# Patient Record
Sex: FEMALE | Race: WHITE | NOT HISPANIC OR LATINO | URBAN - METROPOLITAN AREA
[De-identification: names, ages, dates, MRNs, and addresses within clinical notes are randomized per-mention and may not be internally consistent; named-entity substitution may affect disease eponyms.]

---

## 2022-04-07 ENCOUNTER — TELEMEDICINE (OUTPATIENT)
Dept: FAMILY MEDICINE CLINIC | Facility: TELEHEALTH | Age: 27
End: 2022-04-07

## 2022-04-07 ENCOUNTER — E-VISIT (OUTPATIENT)
Dept: ADMINISTRATIVE | Facility: OTHER | Age: 27
End: 2022-04-07

## 2022-04-07 DIAGNOSIS — N39.0 URINARY TRACT INFECTION WITHOUT HEMATURIA, SITE UNSPECIFIED: Primary | ICD-10-CM

## 2022-04-07 PROCEDURE — 99203 OFFICE O/P NEW LOW 30 MIN: CPT | Performed by: NURSE PRACTITIONER

## 2022-04-07 RX ORDER — AMOXICILLIN 875 MG/1
875 TABLET, COATED ORAL 2 TIMES DAILY
Qty: 20 TABLET | Refills: 0 | Status: SHIPPED | OUTPATIENT
Start: 2022-04-07 | End: 2022-04-17

## 2022-04-07 NOTE — PATIENT INSTRUCTIONS
Contact OB/GYN for possible culture results from initial UTI to discuss antibiotic change if needed. Follow up as needed. Increase fluids, anything but caffeine since it is a bladder irritant. Follow up with Urgent Care if symptoms worsen or the treatment provided does not resolve the illness. Go to the nearest Emergency Department for any signs of worsening kidney infection, such as severe back or abdominal pain with or without Nausea/Vomiting, fever, chills, hematuria or difficulty urinating.

## 2022-04-07 NOTE — PROGRESS NOTES
"Mode of Visit: Video  Location of patient: home  You have chosen to receive care through a telehealth visit.  The patient has signed the video visit consent form.  The visit included audio and video interaction. No technical issues occurred during this visit.   Video Visit Reason:   Free Text Description: Painful urination.  Chief Complaint  Urinary Tract Infection    Subjective          Karin Howell presents to [facility]    Urinary frequency, burning with urination since yesterday. She reports having a UTI on 3/27/2022 when she went into the ED for back pain. She was felt to have a \"kidney infection\". She was given something in her IV for pain and sent home with Cefdinir which she took until 4/2/2022. Symptoms seems to be resolved until yesterday. She reports having a vaginal delivery on 3/22/2022 and did have an indwelling catheter for several hours due to the >24 hr labor. Currently, she is having no back pain, fever, chills, nausea or hematuria. She is breastfeeding.    Urinary Tract Infection   This is a new problem. The current episode started yesterday. The quality of the pain is described as burning. There has been no fever. Associated symptoms include frequency and urgency. Pertinent negatives include no chills, discharge, flank pain, hematuria or nausea.     Review of Systems   Constitutional: Negative for chills and fever.   Gastrointestinal: Negative for nausea.   Genitourinary: Positive for dysuria, frequency, urgency and vaginal bleeding (recent vaginal delivery on 3/22/2022). Negative for decreased urine volume, difficulty urinating, flank pain, hematuria and vaginal discharge.     Objective   Vital Signs:   There were no vitals taken for this visit.    Physical Exam   Constitutional: She appears well-nourished. She does not have a sickly appearance. She does not appear ill. No distress.   HENT:   Mouth/Throat: Mouth/Lips are normal.  Abdominal: She exhibits " no distension and no flank tenderness. There is no CVA tenderness.     Result Review :                 Assessment and Plan    Diagnoses and all orders for this visit:    1. Urinary tract infection without hematuria, site unspecified (Primary)  -     amoxicillin (AMOXIL) 875 MG tablet; Take 1 tablet by mouth 2 (Two) Times a Day for 10 days.  Dispense: 20 tablet; Refill: 0        Increase fluids, anything but caffeine since it is a bladder irritant. Follow up with Urgent Care if symptoms worsen or the treatment provided does not resolve the illness. Go to the nearest Emergency Department for any signs of worsening kidney infection, such as severe back or abdominal pain with or without Nausea/Vomiting, fever, chills, hematuria or difficulty urinating.       I spent 20 minutes caring for Karin on this date of service. This time includes time spent by me in the following activities:preparing for the visit, obtaining and/or reviewing a separately obtained history, performing a medically appropriate examination and/or evaluation , counseling and educating the patient/family/caregiver, ordering medications, tests, or procedures, and documenting information in the medical record  Follow Up   Return if symptoms worsen or fail to improve.  Patient was given instructions and counseling regarding her condition or for health maintenance advice. Please see specific information pulled into the AVS if appropriate.

## 2023-04-17 ENCOUNTER — NURSE TRIAGE (OUTPATIENT)
Dept: CALL CENTER | Facility: HOSPITAL | Age: 28
End: 2023-04-17
Payer: COMMERCIAL

## 2023-04-17 NOTE — TELEPHONE ENCOUNTER
"[facility] transferred. Chest pain      Wanting to establish care with medical group in [place].  No specific provider requested.    Has been having some chest pressure for about a week comes and goes.  She feels it is anxiety related.  Care advice given and when to seek EMS. Advised will try transfer back to [facility] for assist with Medical group and appt. Advised to stay on hold.  She is at work on her break and unable to stay on hold. She will try again later    Reason for Disposition  • [1] Chest pain(s) lasting a few seconds AND [2] persists > 3 days    Additional Information  • Negative: SEVERE difficulty breathing (e.g., struggling for each breath, speaks in single words)  • Negative: Difficult to awaken or acting confused (e.g., disoriented, slurred speech)  • Negative: Shock suspected (e.g., cold/pale/clammy skin, too weak to stand, low BP, rapid pulse)  • Negative: Passed out (i.e., fainted, collapsed and was not responding)  • Negative: [1] Chest pain lasts > 5 minutes AND [2] age > 44  • Negative: [1] Chest pain lasts > 5 minutes AND [2] age > 30 AND [3] one or more cardiac risk factors (e.g., diabetes, high blood pressure, high cholesterol, smoker, or strong family history of heart disease)  • Negative: [1] Chest pain lasts > 5 minutes AND [2] history of heart disease (i.e., angina, heart attack, heart failure, bypass surgery, takes nitroglycerin)  • Negative: [1] Chest pain lasts > 5 minutes AND [2] described as crushing, pressure-like, or heavy  • Negative: Heart beating < 50 beats per minute OR > 140 beats per minute  • Negative: Visible sweat on face or sweat dripping down face  • Negative: Sounds like a life-threatening emergency to the triager  • Negative: Followed a chest injury  • Negative: SEVERE chest pain  • Negative: [1] Chest pain (or \"angina\") comes and goes AND [2] is happening more often (increasing in frequency) or getting worse (increasing in severity) (Exception: chest pains that last only a " "few seconds)  • Negative: Pain also in shoulder(s) or arm(s) or jaw (Exception: pain is clearly made worse by movement)  • Negative: Difficulty breathing  • Negative: Dizziness or lightheadedness  • Negative: Coughing up blood  • Negative: Cocaine use within last 3 days  • Negative: Major surgery in past month  • Negative: Hip or leg fracture (broken bone) in past month (or had cast on leg or ankle in past month)  • Negative: Illness requiring prolonged bedrest in past month (e.g., immobilization, long hospital stay)  • Negative: Long-distance travel in past month (e.g., car, bus, train, plane; with trip lasting 6 or more hours)  • Negative: History of prior \"blood clot\" in leg or lungs (i.e., deep vein thrombosis, pulmonary embolism)  • Negative: History of inherited increased risk of blood clots (e.g., Factor 5 Leiden, Anti-thrombin 3, Protein C or Protein S deficiency, Prothrombin mutation)  • Negative: Cancer treatment in past six months (or has cancer now)  • Negative: [1] Chest pain lasts > 5 minutes AND [2] occurred in past 3 days (72 hours) (Exception: feels exactly the same as previously diagnosed heartburn and has accompanying sour taste in mouth)  • Negative: Taking a deep breath makes pain worse  • Negative: Patient sounds very sick or weak to the triager  • Negative: [1] Chest pain lasts > 5 minutes AND [2] occurred > 3 days ago (72 hours) AND [3] NO chest pain or cardiac symptoms now  • Negative: [1] Chest pain lasting < 5 minutes AND [2] NO chest pain or cardiac symptoms (e.g., breathing difficulty, sweating) now (Exception: chest pains that last only a few seconds)  • Negative: Fever > 100.4 F (38.0 C)  • Negative: Rash in same area as pain (may be described as \"small blisters\")  • Negative: [1] Patient says chest pain feels exactly the same as previously diagnosed \"heartburn\" AND [2] describes burning in chest AND [3] accompanying sour taste in mouth  • Negative: [1] Chest pain lasting < 5 minutes " "AND [2] has not taken prescribed nitroglycerin  • Negative: [1] Chest pain lasting < 5 minutes AND [2] completely gone after taking nitroglycerin  • Negative: [1] Chest pain from known angina comes and goes AND [2] is NOT happening more often (increasing in frequency) or getting worse (increasing in severity)  • Negative: [1] Chest pain(s) lasting a few seconds from coughing AND [2] persists > 3 days    Answer Assessment - Initial Assessment Questions  1. LOCATION: \"Where does it hurt?\"       Center of chest  2. RADIATION: \"Does the pain go anywhere else?\" (e.g., into neck, jaw, arms, back)     No radiation  3. ONSET: \"When did the chest pain begin?\" (Minutes, hours or days)   About 1 week  4. PATTERN \"Does the pain come and go, or has it been constant since it started?\"  \"Does it get worse with exertion?\"   Comes and goes  5. DURATION: \"How long does it last\" (e.g., seconds, minutes, hours)     Varies about half the day  6. SEVERITY: \"How bad is the pain?\"  (e.g., Scale 1-10; mild, moderate, or severe)     - MILD (1-3): doesn't interfere with normal activities      - MODERATE (4-7): interferes with normal activities or awakens from sleep     - SEVERE (8-10): excruciating pain, unable to do any normal activities      2-3  Pressure type pain no sharp pains  7. CARDIAC RISK FACTORS: \"Do you have any history of heart problems or risk factors for heart disease?\" (e.g., angina, prior heart attack; diabetes, high blood pressure, high cholesterol, smoker, or strong family history of heart disease)   denies  8. PULMONARY RISK FACTORS: \"Do you have any history of lung disease?\"  (e.g., blood clots in lung, asthma, emphysema, birth control pills)  denies  9. CAUSE: \"What do you think is causing the chest pain?\"  Not sure thinks it might be stress related  10. OTHER SYMPTOMS: \"Do you have any other symptoms?\" (e.g., dizziness, nausea, vomiting, sweating, fever, difficulty breathing, cough)      With pressure breathing is " "different  11. PREGNANCY: \"Is there any chance you are pregnant?\" \"When was your last menstrual period?\"        *No Answer*    Protocols used: CHEST PAIN-ADULT-AH      "

## 2023-04-26 ENCOUNTER — OFFICE VISIT (OUTPATIENT)
Dept: FAMILY MEDICINE CLINIC | Facility: CLINIC | Age: 28
End: 2023-04-26
Payer: COMMERCIAL

## 2023-04-26 ENCOUNTER — PATIENT ROUNDING (BHMG ONLY) (OUTPATIENT)
Dept: FAMILY MEDICINE CLINIC | Facility: CLINIC | Age: 28
End: 2023-04-26
Payer: COMMERCIAL

## 2023-04-26 VITALS
TEMPERATURE: 97.7 F | HEART RATE: 79 BPM | SYSTOLIC BLOOD PRESSURE: 120 MMHG | HEIGHT: 62 IN | RESPIRATION RATE: 18 BRPM | WEIGHT: 162 LBS | OXYGEN SATURATION: 99 % | BODY MASS INDEX: 29.81 KG/M2 | DIASTOLIC BLOOD PRESSURE: 82 MMHG

## 2023-04-26 DIAGNOSIS — Z00.00 WELLNESS EXAMINATION: Primary | ICD-10-CM

## 2023-04-26 DIAGNOSIS — Z11.59 NEED FOR HEPATITIS C SCREENING TEST: ICD-10-CM

## 2023-04-26 DIAGNOSIS — E66.3 OVERWEIGHT (BMI 25.0-29.9): ICD-10-CM

## 2023-04-26 DIAGNOSIS — F41.9 ANXIETY: ICD-10-CM

## 2023-04-26 RX ORDER — METHYLPREDNISOLONE 4 MG/1
TABLET ORAL
COMMUNITY
Start: 2023-04-24 | End: 2023-05-01

## 2023-04-26 RX ORDER — ESCITALOPRAM OXALATE 10 MG/1
10 TABLET ORAL DAILY
Qty: 30 TABLET | Refills: 1 | Status: SHIPPED | OUTPATIENT
Start: 2023-04-26

## 2023-04-26 NOTE — PROGRESS NOTES
April 26, 2023    Hello, may I speak with Karin Howell?    My name is debra      I am  with [facility] [address]  [facility]  [address] [place] [address] [address]  [place] [address] [phone].    Before we get started may I verify your date of birth? 1995    I am calling to officially welcome you to our practice and ask about your recent visit. Is this a good time to talk? yes    Tell me about your visit with us. What things went well?  it went good       We're always looking for ways to make our patients' experiences even better. Do you have recommendations on ways we may improve?  no    Overall were you satisfied with your first visit to our practice? yes       I appreciate you taking the time to speak with me today. Is there anything else I can do for you? no      Thank you, and have a great day.

## 2023-04-26 NOTE — PROGRESS NOTES
Chief Complaint  Annual Exam, Chest Pain, and Establish Care    Subjective          Karin is a 27 y.o. female  who presents to [facility]     Patient Care Team:  Karoline Duron APRN as PCP - General (Family Medicine)  Wilfrido Cardona MD as Consulting Physician (Obstetrics and Gynecology)     History of Present Illness  Karin is here today to establish care.  Previous PCP Dr. Branch  New patient.    Adult Annual Wellness    Social History    Tobacco Use      Smoking status: Never      Smokeless tobacco: Never    Vaping Use      Vaping Use: Never used    Alcohol use: Yes      Comment: 2 drinks per week    Drug use: Never     Caffeine use - coffee; 2 cups in the AM    General health habits  Last eye exam - several years ago  Last dental exam - 2 years ago    Diet - Weight watchers    Weight / BMI - overweight, BMI 29.6    Exercise -  Not regularly; Walks sometimes    Hours of sleep per night ? 8 hours    Safety -   Do you use seat belts consistently ? Yes  Working smoke detector in home ? Yes    Reproductive health -  Sexually active - yes  Birth control - none  G 1  P1  Ab 0  History of abnormal pap smear? No    Screening/prevention  Last mammogram - not applicable due to age  Last pap smear/ cervical cancer screening - by Dr. Cardona on 4/19/22 = NILM  Colon cancer screening - not applicable due to age  Immunizations - Tdap 12/28/21    She has been having chest pain  Location: center of chest  Quality: dull ache  Severity: mild  Duration: for 1 week  Timing: constant  Context:  She feels stressed and thinks her pain may be related to anxiety.  Unsure what is causing her stress.  Her OB/GYN prescribed her Paxil post partum but she never took it.  She was afraid to start medication.  Alleviating factors:  Tylenol helps some  Aggravating factors: thinking about the pain  Associated Symptoms: + palpitations, + anxiety  PHQ-9 = 2  PEDRO-7 = 20    She [facility] to [facility]  cl[facility] on 4/24/23  She had a chest xray and EKG  Diagnosed with chest pain at rest and pleurisy.   She was prescribed a Medrol Dosepak  She feels the Medrol Dosepak has helped.      Karin Howell  has a past medical history of Anxiety.      Review of Systems   Constitutional: Negative for fatigue and fever.   HENT: Negative for ear pain and sore throat.    Eyes: Negative for visual disturbance.   Respiratory: Negative for cough and shortness of breath.    Cardiovascular: Positive for chest pain (improved) and palpitations.   Gastrointestinal: Negative for abdominal pain, blood in stool, nausea and vomiting.        No heartburn   Endocrine: Negative for cold intolerance, heat intolerance, polydipsia, polyphagia and polyuria.   Genitourinary: Negative for dysuria and hematuria.   Musculoskeletal: Negative for arthralgias.   Skin: Negative for rash.   Neurological: Negative for dizziness, tremors and headaches.   Hematological: Negative for adenopathy.   Psychiatric/Behavioral: Negative for dysphoric mood. The patient is nervous/anxious.         Family History   Problem Relation Age of Onset   • Cervical cancer Mother    • Drug abuse Mother    • Depression Mother    • No Known Problems Father    • Epilepsy Sister    • No Known Problems Maternal Grandmother    • Stroke Maternal Grandfather    • Heart attack Paternal Grandmother    • Lung cancer Paternal Grandfather         Past Surgical History:   Procedure Laterality Date   • EAR TUBES          Social History     Socioeconomic History   • Marital status:      Spouse name: Edin Howell   • Number of children: 1   • Highest education level: Associate degree: academic program   Tobacco Use   • Smoking status: Never   • Smokeless tobacco: Never   Vaping Use   • Vaping Use: Never used   Substance and Sexual Activity   • Alcohol use: Yes     Comment: 2 drinks per week   • Drug use: Never   • Sexual activity: Yes     Partners: Male     Birth  "control/protection: None        Immunization History   Administered Date(s) Administered   • MMR 1995   • Tdap 2021     Menstrual History:  OB History        1    Para   1    Term   1            AB   0    Living           SAB        IAB        Ectopic        Molar        Multiple        Live Births              Obstetric Comments   Menarche 10            Menarche age: 10  Patient's last menstrual period was 2023 (exact date).         Objective       Current Outpatient Medications:   •  methylPREDNISolone (Medrol) 4 MG dose pack, Take  by mouth., Disp: , Rfl:   •  escitalopram (Lexapro) 10 MG tablet, Take 1 tablet by mouth Daily., Disp: 30 tablet, Rfl: 1    Vital Signs:      /82   Pulse 79   Temp 97.7 °F (36.5 °C) (Infrared)   Resp 18   Ht 157.5 cm (62\")   Wt 73.5 kg (162 lb)   LMP 2023 (Exact Date)   SpO2 99%   BMI 29.63 kg/m²     Vitals:    23 0856   BP: 120/82   Pulse: 79   Resp: 18   Temp: 97.7 °F (36.5 °C)   TempSrc: Infrared   SpO2: 99%   Weight: 73.5 kg (162 lb)   Height: 157.5 cm (62\")   PainSc:   3   PainLoc: Chest      Physical Exam  Vitals reviewed.   Constitutional:       General: She is not in acute distress.     Appearance: Normal appearance.   HENT:      Head: Normocephalic and atraumatic.      Right Ear: Tympanic membrane, ear canal and external ear normal.      Left Ear: Tympanic membrane, ear canal and external ear normal.      Nose: Nose normal.      Mouth/Throat:      Mouth: Mucous membranes are moist.      Pharynx: Oropharynx is clear. No oropharyngeal exudate.   Eyes:      General: No scleral icterus.     Conjunctiva/sclera: Conjunctivae normal.   Neck:      Thyroid: No thyromegaly.   Cardiovascular:      Rate and Rhythm: Normal rate and regular rhythm.      Heart sounds: Normal heart sounds.   Pulmonary:      Effort: Pulmonary effort is normal. No respiratory distress.      Breath sounds: Normal breath sounds. No wheezing.   Abdominal: "      General: Bowel sounds are normal. There is no distension.      Palpations: Abdomen is soft. There is no mass.      Tenderness: There is no abdominal tenderness. There is no guarding or rebound.   Musculoskeletal:      Cervical back: Neck supple.      Right lower leg: No edema.      Left lower leg: No edema.   Lymphadenopathy:      Cervical: No cervical adenopathy.   Skin:     General: Skin is warm and dry.   Neurological:      Mental Status: She is alert and oriented to person, place, and time.   Psychiatric:         Mood and Affect: Mood is anxious.        Result Review :       Data reviewed: [facility] clinic note from 4/24/23         PHQ-9 Depression Screening  Little interest or pleasure in doing things? 0-->not at all   Feeling down, depressed, or hopeless? 0-->not at all   Trouble falling or staying asleep, or sleeping too much? 0-->not at all   Feeling tired or having little energy? 0-->not at all   Poor appetite or overeating? 0-->not at all   Feeling bad about yourself - or that you are a failure or have let yourself or your family down? 0-->not at all   Trouble concentrating on things, such as reading the newspaper or watching television? 2-->more than half the days   Moving or speaking so slowly that other people could have noticed? Or the opposite - being so fidgety or restless that you have been moving around a lot more than usual? 0-->not at all   Thoughts that you would be better off dead, or of hurting yourself in some way? 0-->not at all   PHQ-9 Total Score 2   If you checked off any problems, how difficult have these problems made it for you to do your work, take care of things at home, or get along with other people? not difficult at all       Over the last two weeks, how often have you been bothered by the following problems?  Feeling nervous, anxious or on edge: Nearly every day  Not being able to stop or control worrying: Nearly every day  Worrying too much about different  things: Nearly every day  Trouble Relaxing: Nearly every day  Being so restless that it is hard to sit still: More than half the days  Becoming easily annoyed or irritable: Nearly every day  Feeling afraid as if something awful might happen: Nearly every day  PEDRO 7 Total Score: 20  If you checked any problems, how difficult have these problems made it for you to do your work, take care of things at home, or get along with other people: Very difficult           Assessment and Plan    Diagnoses and all orders for this visit:    1. Wellness examination (Primary)  Assessment & Plan:  Discussed preventative healthcare. Labs ordered. Tdap up to date.  Pap smear up to date.  Recommended annual eye and dental exams.    Orders:  -     CBC & Differential  -     Comprehensive Metabolic Panel  -     Lipid Panel  -     TSH Rfx On Abnormal To Free T4    2. Anxiety  Assessment & Plan:  Begin Lexapro 10 mg daily.  Advised if she begins to feel suicidal or if she has worsening symptoms of anxiety and/or depression, she should stop medicine and immediately call for follow-up appointment or seek emergency care.    Orders:  -     escitalopram (Lexapro) 10 MG tablet; Take 1 tablet by mouth Daily.  Dispense: 30 tablet; Refill: 1    3. Overweight (BMI 25.0-29.9)  Assessment & Plan:  Patient's (Body mass index is 29.63 kg/m².) indicates that they are overweight with health conditions that include none . Weight is newly identified. BMI is above average; BMI management plan is completed. We discussed portion control and increasing exercise.       4. Need for hepatitis C screening test  -     Hepatitis C Antibody           Follow Up   Return in about 6 weeks (around 6/7/2023) for follow up anxiety and depression.  Patient was given instructions and counseling regarding her condition or for health maintenance advice. Please see specific information pulled into the AVS if appropriate.    There are no Patient Instructions on file for this visit.

## 2023-04-27 NOTE — ASSESSMENT & PLAN NOTE
Discussed preventative healthcare. Labs ordered. Tdap up to date.  Pap smear up to date.  Recommended annual eye and dental exams.

## 2023-04-27 NOTE — ASSESSMENT & PLAN NOTE
Patient's (Body mass index is 29.63 kg/m².) indicates that they are overweight with health conditions that include none . Weight is newly identified. BMI is above average; BMI management plan is completed. We discussed portion control and increasing exercise.

## 2023-04-27 NOTE — ASSESSMENT & PLAN NOTE
Begin Lexapro 10 mg daily.  Advised if she begins to feel suicidal or if she has worsening symptoms of anxiety and/or depression, she should stop medicine and immediately call for follow-up appointment or seek emergency care.

## 2023-05-04 LAB
ALBUMIN SERPL-MCNC: 4.6 G/DL (ref 3.9–5)
ALBUMIN/GLOB SERPL: 1.8 {RATIO} (ref 1.2–2.2)
ALP SERPL-CCNC: 58 IU/L (ref 44–121)
ALT SERPL-CCNC: 20 IU/L (ref 0–32)
AST SERPL-CCNC: 15 IU/L (ref 0–40)
BASOPHILS # BLD AUTO: 0 X10E3/UL (ref 0–0.2)
BASOPHILS NFR BLD AUTO: 0 %
BILIRUB SERPL-MCNC: 0.4 MG/DL (ref 0–1.2)
BUN SERPL-MCNC: 6 MG/DL (ref 6–20)
BUN/CREAT SERPL: 10 (ref 9–23)
CALCIUM SERPL-MCNC: 9 MG/DL (ref 8.7–10.2)
CHLORIDE SERPL-SCNC: 104 MMOL/L (ref 96–106)
CHOLEST SERPL-MCNC: 149 MG/DL (ref 100–199)
CO2 SERPL-SCNC: 20 MMOL/L (ref 20–29)
CREAT SERPL-MCNC: 0.63 MG/DL (ref 0.57–1)
EGFRCR SERPLBLD CKD-EPI 2021: 123 ML/MIN/1.73
EOSINOPHIL # BLD AUTO: 0 X10E3/UL (ref 0–0.4)
EOSINOPHIL NFR BLD AUTO: 0 %
ERYTHROCYTE [DISTWIDTH] IN BLOOD BY AUTOMATED COUNT: 12.6 % (ref 11.7–15.4)
GLOBULIN SER CALC-MCNC: 2.6 G/DL (ref 1.5–4.5)
GLUCOSE SERPL-MCNC: 86 MG/DL (ref 70–99)
HCT VFR BLD AUTO: 41.5 % (ref 34–46.6)
HCV IGG SERPL QL IA: NON REACTIVE
HDLC SERPL-MCNC: 51 MG/DL
HGB BLD-MCNC: 14 G/DL (ref 11.1–15.9)
IMM GRANULOCYTES # BLD AUTO: 0 X10E3/UL (ref 0–0.1)
IMM GRANULOCYTES NFR BLD AUTO: 0 %
LDLC SERPL CALC-MCNC: 64 MG/DL (ref 0–99)
LYMPHOCYTES # BLD AUTO: 2.4 X10E3/UL (ref 0.7–3.1)
LYMPHOCYTES NFR BLD AUTO: 28 %
MCH RBC QN AUTO: 31.5 PG (ref 26.6–33)
MCHC RBC AUTO-ENTMCNC: 33.7 G/DL (ref 31.5–35.7)
MCV RBC AUTO: 94 FL (ref 79–97)
MONOCYTES # BLD AUTO: 0.5 X10E3/UL (ref 0.1–0.9)
MONOCYTES NFR BLD AUTO: 6 %
NEUTROPHILS # BLD AUTO: 5.5 X10E3/UL (ref 1.4–7)
NEUTROPHILS NFR BLD AUTO: 66 %
PLATELET # BLD AUTO: 260 X10E3/UL (ref 150–450)
POTASSIUM SERPL-SCNC: 4 MMOL/L (ref 3.5–5.2)
PROT SERPL-MCNC: 7.2 G/DL (ref 6–8.5)
RBC # BLD AUTO: 4.44 X10E6/UL (ref 3.77–5.28)
SODIUM SERPL-SCNC: 139 MMOL/L (ref 134–144)
TRIGL SERPL-MCNC: 209 MG/DL (ref 0–149)
TSH SERPL DL<=0.005 MIU/L-ACNC: 0.96 UIU/ML (ref 0.45–4.5)
VLDLC SERPL CALC-MCNC: 34 MG/DL (ref 5–40)
WBC # BLD AUTO: 8.4 X10E3/UL (ref 3.4–10.8)

## 2023-08-08 ENCOUNTER — TELEMEDICINE (OUTPATIENT)
Dept: FAMILY MEDICINE CLINIC | Facility: TELEHEALTH | Age: 28
End: 2023-08-08
Payer: COMMERCIAL

## 2023-08-08 DIAGNOSIS — R39.89 SUSPECTED UTI: Primary | ICD-10-CM

## 2023-08-08 PROCEDURE — 99213 OFFICE O/P EST LOW 20 MIN: CPT | Performed by: NURSE PRACTITIONER

## 2023-08-08 RX ORDER — NITROFURANTOIN 25; 75 MG/1; MG/1
100 CAPSULE ORAL 2 TIMES DAILY
Qty: 14 CAPSULE | Refills: 0 | Status: SHIPPED | OUTPATIENT
Start: 2023-08-08

## 2023-08-08 NOTE — PROGRESS NOTES
You have chosen to receive care through a telehealth visit.  Do you consent to use a video/audio connection for your medical care today? Yes     HPI  Karin Howell is a 27 y.o. female  presents with complaint of 2 day h/o burning with urination, urinary frequency, urgency, left sided back pain and difficulty urinating. She has not had a UTI for over a year. Currently she is using AZO for symptoms and increasing her water intake.     Review of Systems   Constitutional: Negative.    HENT: Negative.     Respiratory: Negative.     Cardiovascular: Negative.    Gastrointestinal:  Negative for abdominal pain, diarrhea, nausea and vomiting.   Genitourinary:  Positive for dysuria, flank pain (left sided back pain), frequency and urgency. Negative for hematuria and pelvic pain.   Psychiatric/Behavioral: Negative.       Past Medical History:   Diagnosis Date    Anxiety        Family History   Problem Relation Age of Onset    Cervical cancer Mother     Drug abuse Mother     Depression Mother     No Known Problems Father     Epilepsy Sister     No Known Problems Maternal Grandmother     Stroke Maternal Grandfather     Heart attack Paternal Grandmother     Lung cancer Paternal Grandfather        Social History     Socioeconomic History    Marital status:      Spouse name: Edin Howell    Number of children: 1    Highest education level: Associate degree: academic program   Tobacco Use    Smoking status: Never    Smokeless tobacco: Never   Vaping Use    Vaping Use: Never used   Substance and Sexual Activity    Alcohol use: Yes     Comment: 2 drinks per week    Drug use: Never    Sexual activity: Yes     Partners: Male     Birth control/protection: None         There were no vitals taken for this visit.    PHYSICAL EXAM  Physical Exam   Constitutional: She is oriented to person, place, and time. She appears well-developed and well-nourished. She does not have a sickly appearance. She does not appear ill. No  distress.   HENT:   Head: Normocephalic and atraumatic.   Abdominal: She exhibits no distension. There is no abdominal tenderness.   Neurological: She is alert and oriented to person, place, and time.   Psychiatric: She has a normal mood and affect.   Vitals reviewed.    Diagnoses and all orders for this visit:    1. Suspected UTI (Primary)    -Macrobid as prescribed - complete entire course of medication even if you begin to feel better.   --AZO is for painful urination and bladder spasms--this medication with cause urine to become bright orange and can stain undergarments.    -Continue to increase your fluid intake.   -Abstain from intercourse during antibiotic treatment.   -Practice good perineal hygiene: wipe front to back  -Do not hold your urine- go to the bathroom every 2-3 hours.     -Warning signs: severe abdominal/pelvic/back pain, fever >101, blood in urine - seek medical attention as soon as possible for a hands on/objective exam and possible labs.     -Follow up with your PCP in 2 days if no improvement in symptoms or if symptoms begin to worsen.        FOLLOW-UP  As discussed during visit with [facility], if symptoms worsen or fail to improve, follow-up with PCP/Urgent Care/Emergency Department.    Patient verbalizes understanding of medications, instructions for treatment and follow-up.    June Sevilla, PEBBLES  08/08/2023  14:17 EDT    The use of a video visit has been reviewed with the patient and verbal informed consent has been obtained. Myself and Karin Howell participated in this visit. The patient is located in  [facility], and I am located in [place], [place]. MyChart and [employer] were utilized.

## 2023-08-22 NOTE — E-VISIT ESCALATED
Chief Complaint: Bladder infection (UTI)   Patient was shown the following escalation message:   Some conditions need a visit with a healthcare provider   Because your UTI symptoms may be related to a recent urologic procedure, you should speak with a provider to get care.   ----------   Patient Interview Transcript:   Knowing about your anatomy is important for diagnosing and treating UTIs. The gender we have on file for you is female, but we realize that this might not tell the whole story. Would you like to tell us more about your anatomy?    Yes   Not selected:    No   OK, which of these do you have? Select all that apply.    Vagina    Ovaries    Uterus    Breasts   Not selected:    Penis    Testes    Prostate   Which of these symptoms do you have? Select all that apply.    Pain or burning while urinating    Frequent urination    Sudden urge to urinate and it's hard to hold the urine in   How long have you had these symptoms? Select one.    1 to 3 days   Not selected:    Less than 24 hours    4 to 6 days    7 to 10 days    More than 10 days   Since your current symptoms started, has it been difficult to start, stop, or delay urination? Select one.    Yes   Not selected:    No   What color is your urine? Select one.    Cloudy   Not selected:    Clear    Yellow    Pink or red   Does your urine smell strange (like ammonia) or stronger than usual? Select one.    No   Not selected:    Yes   Do you also have any of these symptoms? Select all that apply.    Nausea   Not selected:    Fever    Vomiting    Pain, pressure, or discomfort in the lower abdomen    Back pain    No   Do you have any flank pain? The flank is the side of the body between the ribs and the hips.    No   Not selected:    Yes, in my left flank    Yes, in my right flank    Yes, in both my left and right flanks   Do you have any of these vaginal symptoms? Select all that apply.    No   Not selected:    Abnormal vaginal itching    Unscheduled or  22-Aug-2023 00:37 abnormal vaginal bleeding or spotting    Pain during sex    Visible sores on the vagina    Abnormal vaginal discharge   In the past 2 weeks, have you had a medical device or instrument placed in your urinary tract? Examples include catheters, stents, and nephrostomy tubes. Select one.    Yes   Not selected:    No   ----------   Medical history   Medical history data does not currently exist for this patient.     22-Aug-2023 01:05